# Patient Record
Sex: FEMALE | URBAN - METROPOLITAN AREA
[De-identification: names, ages, dates, MRNs, and addresses within clinical notes are randomized per-mention and may not be internally consistent; named-entity substitution may affect disease eponyms.]

---

## 2017-06-15 ENCOUNTER — APPOINTMENT (RX ONLY)
Dept: URBAN - METROPOLITAN AREA CLINIC 158 | Facility: CLINIC | Age: 47
Setting detail: DERMATOLOGY
End: 2017-06-15

## 2017-06-15 DIAGNOSIS — L81.0 POSTINFLAMMATORY HYPERPIGMENTATION: ICD-10-CM

## 2017-06-15 DIAGNOSIS — L28.1 PRURIGO NODULARIS: ICD-10-CM

## 2017-06-15 PROCEDURE — ? TREATMENT REGIMEN

## 2017-06-15 PROCEDURE — ? COUNSELING

## 2017-06-15 PROCEDURE — 99213 OFFICE O/P EST LOW 20 MIN: CPT

## 2017-06-15 PROCEDURE — ? PRESCRIPTION

## 2017-06-15 RX ORDER — BETAMETHASONE DIPROPIONATE 0.5 MG/G
OINTMENT TOPICAL
Qty: 50 | Refills: 5 | Status: ERX | COMMUNITY
Start: 2017-06-15

## 2017-06-15 RX ORDER — HYDROXYZINE HYDROCHLORIDE 25 MG/1
TABLET, FILM COATED ORAL
Qty: 30 | Refills: 11 | Status: ERX | COMMUNITY
Start: 2017-06-15

## 2017-06-15 RX ORDER — FEXOFENADINE 180 MG/1
TABLET, FILM COATED ORAL
Qty: 30 | Refills: 11 | Status: ERX | COMMUNITY
Start: 2017-06-15

## 2017-06-15 RX ADMIN — FEXOFENADINE: 180 TABLET, FILM COATED ORAL at 16:49

## 2017-06-15 RX ADMIN — BETAMETHASONE DIPROPIONATE: 0.5 OINTMENT TOPICAL at 16:49

## 2017-06-15 RX ADMIN — HYDROXYZINE HYDROCHLORIDE: 25 TABLET, FILM COATED ORAL at 16:49

## 2017-06-15 ASSESSMENT — LOCATION ZONE DERM
LOCATION ZONE: ARM
LOCATION ZONE: FACE

## 2017-06-15 ASSESSMENT — LOCATION DETAILED DESCRIPTION DERM
LOCATION DETAILED: LEFT DISTAL DORSAL FOREARM
LOCATION DETAILED: LEFT INFERIOR MEDIAL MALAR CHEEK
LOCATION DETAILED: LEFT PROXIMAL DORSAL FOREARM
LOCATION DETAILED: RIGHT DISTAL RADIAL DORSAL FOREARM
LOCATION DETAILED: RIGHT PROXIMAL RADIAL DORSAL FOREARM
LOCATION DETAILED: LEFT PROXIMAL RADIAL DORSAL FOREARM
LOCATION DETAILED: RIGHT PROXIMAL DORSAL FOREARM
LOCATION DETAILED: LEFT INFERIOR FOREHEAD

## 2017-06-15 ASSESSMENT — LOCATION SIMPLE DESCRIPTION DERM
LOCATION SIMPLE: LEFT FOREARM
LOCATION SIMPLE: LEFT CHEEK
LOCATION SIMPLE: RIGHT FOREARM
LOCATION SIMPLE: LEFT FOREHEAD

## 2017-06-15 NOTE — PROCEDURE: TREATMENT REGIMEN
Initiate Treatment: fexofenadine 180mg Qam, hydroxyzine 25mg QHS, augmented betamethasone ointment BID for body
Discontinue Regimen: clobetasol ointment d/t non-improvement
Detail Level: Zone
Continue Regimen: hydoquinone 4% cream QHS
Continue Regimen: fluocinolone ointment BID for face
Initiate Treatment: fexofenadine 180mg Qam, hydroxyzine 25mg QHS